# Patient Record
Sex: MALE | Race: ASIAN
[De-identification: names, ages, dates, MRNs, and addresses within clinical notes are randomized per-mention and may not be internally consistent; named-entity substitution may affect disease eponyms.]

---

## 2019-08-30 ENCOUNTER — HOSPITAL ENCOUNTER (EMERGENCY)
Dept: HOSPITAL 72 - EMR | Age: 64
Discharge: ANOTHER HOSPITAL | End: 2019-08-30
Payer: COMMERCIAL

## 2019-08-30 VITALS — DIASTOLIC BLOOD PRESSURE: 58 MMHG | SYSTOLIC BLOOD PRESSURE: 119 MMHG

## 2019-08-30 VITALS — DIASTOLIC BLOOD PRESSURE: 86 MMHG | SYSTOLIC BLOOD PRESSURE: 108 MMHG

## 2019-08-30 VITALS — BODY MASS INDEX: 23.3 KG/M2 | HEIGHT: 66 IN | WEIGHT: 145 LBS

## 2019-08-30 DIAGNOSIS — I50.9: ICD-10-CM

## 2019-08-30 DIAGNOSIS — D64.9: ICD-10-CM

## 2019-08-30 DIAGNOSIS — A41.9: Primary | ICD-10-CM
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
## 2019-08-30 DIAGNOSIS — I48.91: ICD-10-CM

## 2019-08-30 DIAGNOSIS — J18.9: ICD-10-CM

## 2019-08-30 LAB
ADD MANUAL DIFF: NO
ALBUMIN SERPL-MCNC: 2.1 G/DL (ref 3.4–5)
ALBUMIN/GLOB SERPL: 0.4 {RATIO} (ref 1–2.7)
ALP SERPL-CCNC: 138 U/L (ref 46–116)
ALT SERPL-CCNC: 80 U/L (ref 12–78)
ANION GAP SERPL CALC-SCNC: 3 MMOL/L (ref 5–15)
APPEARANCE UR: CLEAR
APTT BLD: 29 SEC (ref 23–33)
APTT PPP: YELLOW S
AST SERPL-CCNC: 87 U/L (ref 15–37)
BASOPHILS NFR BLD AUTO: 0.5 % (ref 0–2)
BILIRUB SERPL-MCNC: 0.7 MG/DL (ref 0.2–1)
BUN SERPL-MCNC: 16 MG/DL (ref 7–18)
CALCIUM SERPL-MCNC: 8.8 MG/DL (ref 8.5–10.1)
CHLORIDE SERPL-SCNC: 97 MMOL/L (ref 98–107)
CK MB SERPL-MCNC: 0.5 NG/ML (ref 0–3.6)
CK SERPL-CCNC: 227 U/L (ref 26–308)
CO2 SERPL-SCNC: 31 MMOL/L (ref 21–32)
CREAT SERPL-MCNC: 0.8 MG/DL (ref 0.55–1.3)
EOSINOPHIL NFR BLD AUTO: 2.6 % (ref 0–3)
ERYTHROCYTE [DISTWIDTH] IN BLOOD BY AUTOMATED COUNT: 14 % (ref 11.6–14.8)
GLOBULIN SER-MCNC: 5.4 G/DL
GLUCOSE UR STRIP-MCNC: NEGATIVE MG/DL
HCT VFR BLD CALC: 28.1 % (ref 42–52)
HGB BLD-MCNC: 9 G/DL (ref 14.2–18)
INR PPP: 1 (ref 0.9–1.1)
KETONES UR QL STRIP: NEGATIVE
LEUKOCYTE ESTERASE UR QL STRIP: (no result)
LYMPHOCYTES NFR BLD AUTO: 7.4 % (ref 20–45)
MCV RBC AUTO: 91 FL (ref 80–99)
MONOCYTES NFR BLD AUTO: 7.6 % (ref 1–10)
NEUTROPHILS NFR BLD AUTO: 81.9 % (ref 45–75)
NITRITE UR QL STRIP: NEGATIVE
PH UR STRIP: 6 [PH] (ref 4.5–8)
PLATELET # BLD: 256 K/UL (ref 150–450)
POTASSIUM SERPL-SCNC: 3.7 MMOL/L (ref 3.5–5.1)
PROT UR QL STRIP: (no result)
RBC # BLD AUTO: 3.09 M/UL (ref 4.7–6.1)
SODIUM SERPL-SCNC: 131 MMOL/L (ref 136–145)
SP GR UR STRIP: 1.01 (ref 1–1.03)
UROBILINOGEN UR-MCNC: 1 MG/DL (ref 0–1)
WBC # BLD AUTO: 9.8 K/UL (ref 4.8–10.8)

## 2019-08-30 PROCEDURE — 82553 CREATINE MB FRACTION: CPT

## 2019-08-30 PROCEDURE — 96365 THER/PROPH/DIAG IV INF INIT: CPT

## 2019-08-30 PROCEDURE — 96367 TX/PROPH/DG ADDL SEQ IV INF: CPT

## 2019-08-30 PROCEDURE — 87040 BLOOD CULTURE FOR BACTERIA: CPT

## 2019-08-30 PROCEDURE — 82550 ASSAY OF CK (CPK): CPT

## 2019-08-30 PROCEDURE — 85610 PROTHROMBIN TIME: CPT

## 2019-08-30 PROCEDURE — 71045 X-RAY EXAM CHEST 1 VIEW: CPT

## 2019-08-30 PROCEDURE — 93005 ELECTROCARDIOGRAM TRACING: CPT

## 2019-08-30 PROCEDURE — 36415 COLL VENOUS BLD VENIPUNCTURE: CPT

## 2019-08-30 PROCEDURE — 85730 THROMBOPLASTIN TIME PARTIAL: CPT

## 2019-08-30 PROCEDURE — 84484 ASSAY OF TROPONIN QUANT: CPT

## 2019-08-30 PROCEDURE — 80053 COMPREHEN METABOLIC PANEL: CPT

## 2019-08-30 PROCEDURE — 96361 HYDRATE IV INFUSION ADD-ON: CPT

## 2019-08-30 PROCEDURE — 81003 URINALYSIS AUTO W/O SCOPE: CPT

## 2019-08-30 PROCEDURE — 99284 EMERGENCY DEPT VISIT MOD MDM: CPT

## 2019-08-30 PROCEDURE — 83605 ASSAY OF LACTIC ACID: CPT

## 2019-08-30 PROCEDURE — 85025 COMPLETE CBC W/AUTO DIFF WBC: CPT

## 2019-08-30 NOTE — NUR
ER DISCHARGE NOTE:

Patient is cleared to be discharged per ERMD, pt is aox1, on 5L, with stable 
vital signs. pt was picked up by [facility]. Report was given to Katia PIERCE at [facility]. Skin issues noted.

## 2019-08-30 NOTE — NUR
ED Nurse Note:

Received pt from [place] RN. Pt stable with RT at bedside. Pt in bed with 
gleason in place, track withno vent, and rt side IV patent and flushing. Skin 
issues noted.

## 2019-08-30 NOTE — NUR
ED Nurse Note:

pt brought in by ambulance from [facility] c/c bradycardia, tachypnea 
and fever, per EMS report, pt was hot to touch but unk temp, pt had episode of 
low heartrate around 40s-50s, and also increase RR. upon arrival noted pt 
lethargic, tachypnea (RR=35), and hot to touch, noted pt afib on cardiac 
monitor. will cont monitor. safety precautions in place.

## 2019-08-30 NOTE — NUR
ED Nurse Note:



noted pt temp 103.1 rectal. ERMD notified. noted pt pressure ulcer with 
dressing on sacral area and skin fold area, picture taken.

## 2019-08-30 NOTE — DIAGNOSTIC IMAGING REPORT
Indication: Dyspnea

 

Comparison:  None

 

A single view chest radiograph was obtained.

 

Findings:

 

Prominent pulmonary vascularity demonstrated. The heart is enlarged. There is a

mechanical heart valve, sternotomy and tracheostomy noted. Left pleural effusion may

present as there is silhouetting of the left hemidiaphragm. Bones are osteopenic.

 

IMPRESSION:

 

Congestive heart failure.

 

Suspect a left pleural effusion

## 2019-08-30 NOTE — NUR
ED Nurse Note:



per ERMD order, gleason cath 16 fr inserted, 1x attempt, pt tolerated well w/o 
difficulty. will cont monitor. gleason cath draining well.

## 2019-08-30 NOTE — CARDIOLOGY REPORT
--------------- APPROVED REPORT --------------





EKG Measurement

Heart [id number]

[id number]

[id number]

[id number]





Atrial fibrillation with rapid ventricular response

Nonspecific T wave abnormality

Abnormal ECG

## 2019-08-30 NOTE — EMERGENCY ROOM REPORT
History of Present Illness


General


Chief Complaint:  Fever


Source:  Medical Record, EMS





Present Illness


HPI


This is a 64-year-old [place] male, nursing home patient who has a history of 

subdural hematoma, atrial fibrillation, CAD status post bypass.  Resents with 

chief complaint of fever and sepsis.  Going to nursing home note, he also had 

low heart rate.  Unable to get any other history from patient because of his 

condition.  History is from nursing home note and EMS.  He noted to have a 

fever today.  He has a tracheostomy and Gtube.


Allergies:  


Coded Allergies:  


     No Known Allergies (Unverified , 8/30/19)





Patient History


Past Medical History:  see triage record, old chart reviewed


Past Surgical History:  other


Pertinent Family History:  none


Social History:  Denies: smoking


Immunizations:  UTD


Reviewed Nursing Documentation:  PMH: Agreed; PSxH: Agreed





Review of Systems


All Other Systems:  limited - Secondary to his condition





Physical Exam





Vital Signs








  Date Time  Temp Pulse Resp B/P (MAP) Pulse Ox O2 Delivery O2 Flow Rate FiO2


 


8/30/19 01:57  120 28 94/66 (75) 100 Trach Collar  





With tachycardia


Sp02 EP Interpretation:  reviewed, normal


General Appearance:  mild distress, lethargic


Head:  normocephalic, atraumatic


Eyes:  bilateral eye PERRL, bilateral eye EOMI


ENT:  hearing grossly normal, normal pharynx


Neck:  full range of motion, supple, no meningismus, tracheotomy


Respiratory:  chest non-tender, lungs clear, normal breath sounds


Cardiovascular #1:  no murmur, irregularly irregular


Gastrointestinal:  normal bowel sounds, non tender, no mass, no organomegaly, 

no bruit, non-distended


Musculoskeletal:  back normal, normal range of motion


Psychiatric:  mood/affect normal





Medical Decision Making


Diagnostic Impression:  


 Primary Impression:  


 Sepsis


 Qualified Codes:  A41.9 - Sepsis, unspecified organism


 Additional Impressions:  


 Healthcare-associated pneumonia


 Atrial fibrillation


 Qualified Codes:  I48.2 - Chronic atrial fibrillation


 Anemia, chronic disease


ER Course


Patient with sepsis secondary to pneumonia.  He is in atrial fibrillation but 

heart rates been stable.  No evidence of ACS, PE, dissection at this moment in 

time.  Antibiotics given.  Vitals been stable.  Lactic acid normal.  Patient 

accepted by Dr. Wylie for transfer to [facility].


Lab Results Impression


Labs unremarkable


EKG Diagnostic Results


Rate:  tachycardiac


Rhythm:  other - afib


ST Segments:  other - NSST changes





Rhythm Strip Diag. Results


EP Interpretation:  yes


Rate:  110


Rhythm:  no PVC's, no ectopy, other - afib





Chest X-Ray Diagnostic Results


Chest X-Ray Diagnostic Results :  


   Chest X-Ray Ordered:  Yes


   # of Views/Limited/Complete:  1 View


   Indication:  Shortness of Breath


   EP Interpretation:  Yes


   Interpretation:  no effusion, no pneumothorax, other - Cardiomegaly with 

bilateral infiltrates


   Impression:  Other - b/l infiltrates


   Electronically Signed by:  Rajan Carter MD





Last Vital Signs








  Date Time  Temp Pulse Resp B/P (MAP) Pulse Ox O2 Delivery O2 Flow Rate FiO2


 


8/30/19 01:57  120 28 94/66 (75) 100 Trach Collar  








Status:  improved


Disposition:  XFER [facility] HOSP


Condition:  Stable











Rajan Carter MD Aug 30, 2019 02:09

## 2019-08-30 NOTE — NUR
ED Nurse Note:



noted gtube and trach,

pt not on vent, only on mask via 5L/min. Size 8, migdalia. RT contacted

## 2019-08-30 NOTE — NUR
ED Nurse Note:

Spoke with Rob at [facility], she states there is no POLST, but the 
patient is a full code.